# Patient Record
Sex: MALE | Race: BLACK OR AFRICAN AMERICAN
[De-identification: names, ages, dates, MRNs, and addresses within clinical notes are randomized per-mention and may not be internally consistent; named-entity substitution may affect disease eponyms.]

---

## 2021-03-26 ENCOUNTER — HOSPITAL ENCOUNTER (INPATIENT)
Dept: HOSPITAL 8 - ED | Age: 46
LOS: 1 days | Discharge: HOME | DRG: 393 | End: 2021-03-27
Attending: HOSPITALIST | Admitting: FAMILY MEDICINE
Payer: SELF-PAY

## 2021-03-26 VITALS — DIASTOLIC BLOOD PRESSURE: 84 MMHG | SYSTOLIC BLOOD PRESSURE: 121 MMHG

## 2021-03-26 VITALS — SYSTOLIC BLOOD PRESSURE: 137 MMHG | DIASTOLIC BLOOD PRESSURE: 72 MMHG

## 2021-03-26 VITALS — BODY MASS INDEX: 21.08 KG/M2 | WEIGHT: 150.58 LBS | HEIGHT: 71 IN

## 2021-03-26 DIAGNOSIS — D72.829: ICD-10-CM

## 2021-03-26 DIAGNOSIS — E87.2: ICD-10-CM

## 2021-03-26 DIAGNOSIS — R65.10: ICD-10-CM

## 2021-03-26 DIAGNOSIS — G89.29: ICD-10-CM

## 2021-03-26 DIAGNOSIS — D72.825: ICD-10-CM

## 2021-03-26 DIAGNOSIS — F12.10: ICD-10-CM

## 2021-03-26 DIAGNOSIS — K94.09: Primary | ICD-10-CM

## 2021-03-26 DIAGNOSIS — N17.0: ICD-10-CM

## 2021-03-26 DIAGNOSIS — Z20.822: ICD-10-CM

## 2021-03-26 LAB
<PLATELET ESTIMATE>: ADEQUATE
<PLT MORPHOLOGY>: (no result)
ALBUMIN SERPL-MCNC: 5 G/DL (ref 3.4–5)
ALP SERPL-CCNC: 87 U/L (ref 45–117)
ALT SERPL-CCNC: 27 U/L (ref 12–78)
ANION GAP SERPL CALC-SCNC: 10 MMOL/L (ref 5–15)
BAND#(MANUAL): 1.47 X10^3/UL
BILIRUB SERPL-MCNC: 0.7 MG/DL (ref 0.2–1)
CALCIUM SERPL-MCNC: 10.5 MG/DL (ref 8.5–10.1)
CHLORIDE SERPL-SCNC: 106 MMOL/L (ref 98–107)
CLOSTRIDIUM DIFFICILE ANTIGEN: NEGATIVE
CLOSTRIDIUM DIFFICILE TOXIN: NEGATIVE
CREAT SERPL-MCNC: 1.39 MG/DL (ref 0.7–1.3)
D DIMER PPP FEU-MCNC: 0.32 UG/MLFEU (ref 0–0.52)
ERYTHROCYTE [DISTWIDTH] IN BLOOD BY AUTOMATED COUNT: 13.4 % (ref 9.4–14.8)
INR PPP: 1.09 (ref 0.93–1.1)
LYMPH#(MANUAL): 1.62 X10^3/UL (ref 1–3.4)
LYMPHS% (MANUAL): 11 % (ref 22–44)
MCH RBC QN AUTO: 28.3 PG (ref 27.5–34.5)
MCHC RBC AUTO-ENTMCNC: 32.6 G/DL (ref 33.2–36.2)
MD: YES
MICROSCOPIC: (no result)
MONOS#(MANUAL): 0.44 X10^3/UL (ref 0.3–2.7)
MONOS% (MANUAL): 3 % (ref 2–9)
NEUTS BAND NFR BLD: 10 % (ref 0–7)
OVALOCYTES BLD QL SMEAR: (no result)
PLATELET # BLD AUTO: 215 X10^3/UL (ref 130–400)
PMV BLD AUTO: 9.7 FL (ref 7.4–10.4)
PROT SERPL-MCNC: 8.6 G/DL (ref 6.4–8.2)
PROTHROMBIN TIME: 11.6 SECONDS (ref 9.6–11.5)
RBC # BLD AUTO: 5.06 X10^6/UL (ref 4.38–5.82)
SEG#(MANUAL): 11.1 X10^3/UL (ref 1.8–6.8)
SEGS% (MANUAL): 76 % (ref 42–75)
TROPONIN I SERPL-MCNC: < 0.015 NG/ML (ref 0–0.04)

## 2021-03-26 PROCEDURE — 96361 HYDRATE IV INFUSION ADD-ON: CPT

## 2021-03-26 PROCEDURE — 84145 PROCALCITONIN (PCT): CPT

## 2021-03-26 PROCEDURE — 87324 CLOSTRIDIUM AG IA: CPT

## 2021-03-26 PROCEDURE — 85651 RBC SED RATE NONAUTOMATED: CPT

## 2021-03-26 PROCEDURE — 85610 PROTHROMBIN TIME: CPT

## 2021-03-26 PROCEDURE — 96375 TX/PRO/DX INJ NEW DRUG ADDON: CPT

## 2021-03-26 PROCEDURE — 80307 DRUG TEST PRSMV CHEM ANLYZR: CPT

## 2021-03-26 PROCEDURE — 85025 COMPLETE CBC W/AUTO DIFF WBC: CPT

## 2021-03-26 PROCEDURE — 85379 FIBRIN DEGRADATION QUANT: CPT

## 2021-03-26 PROCEDURE — 83605 ASSAY OF LACTIC ACID: CPT

## 2021-03-26 PROCEDURE — 93005 ELECTROCARDIOGRAM TRACING: CPT

## 2021-03-26 PROCEDURE — U0003 INFECTIOUS AGENT DETECTION BY NUCLEIC ACID (DNA OR RNA); SEVERE ACUTE RESPIRATORY SYNDROME CORONAVIRUS 2 (SARS-COV-2) (CORONAVIRUS DISEASE [COVID-19]), AMPLIFIED PROBE TECHNIQUE, MAKING USE OF HIGH THROUGHPUT TECHNOLOGIES AS DESCRIBED BY CMS-2020-01-R: HCPCS

## 2021-03-26 PROCEDURE — 74177 CT ABD & PELVIS W/CONTRAST: CPT

## 2021-03-26 PROCEDURE — 36415 COLL VENOUS BLD VENIPUNCTURE: CPT

## 2021-03-26 PROCEDURE — 83690 ASSAY OF LIPASE: CPT

## 2021-03-26 PROCEDURE — 87040 BLOOD CULTURE FOR BACTERIA: CPT

## 2021-03-26 PROCEDURE — 82728 ASSAY OF FERRITIN: CPT

## 2021-03-26 PROCEDURE — 84484 ASSAY OF TROPONIN QUANT: CPT

## 2021-03-26 PROCEDURE — 82962 GLUCOSE BLOOD TEST: CPT

## 2021-03-26 PROCEDURE — 86140 C-REACTIVE PROTEIN: CPT

## 2021-03-26 PROCEDURE — 80053 COMPREHEN METABOLIC PANEL: CPT

## 2021-03-26 PROCEDURE — 89055 LEUKOCYTE ASSESSMENT FECAL: CPT

## 2021-03-26 PROCEDURE — 81003 URINALYSIS AUTO W/O SCOPE: CPT

## 2021-03-26 PROCEDURE — 83615 LACTATE (LD) (LDH) ENZYME: CPT

## 2021-03-26 PROCEDURE — 71045 X-RAY EXAM CHEST 1 VIEW: CPT

## 2021-03-26 PROCEDURE — 96365 THER/PROPH/DIAG IV INF INIT: CPT

## 2021-03-26 RX ADMIN — SODIUM CHLORIDE, SODIUM LACTATE, POTASSIUM CHLORIDE, AND CALCIUM CHLORIDE SCH MLS/HR: .6; .31; .03; .02 INJECTION, SOLUTION INTRAVENOUS at 16:30

## 2021-03-26 NOTE — NUR
Pt resting comfortably in bed with eyes closed, rechecked temp, gave meds per 
MAR. Pt is requesting pain meds.

## 2021-03-26 NOTE — NUR
Pt provided additional warm blanket per request. Spoke with Dr. Mitchell about 
potential need for blood cultures for pt. Orders recieved.  now at 
bedside to collect ordered specimens. Pt talking on his phone, ABDIRAHMAN

## 2021-03-26 NOTE — NUR
Stool sample collected from pt's ostomy site. Specimen labeled at bedside, 
walked to lab. Multiple folded money noted in bed next to pt. This RN offered 
to have security keep the money safe and pt refuses this, taking the money and 
putting it in his pocket. Pt resting in bed with eyes closed, resp even and 
unlabored. Pt calm when staff not in room, when staff enters room pt begins 
moaning and shifting in bed. POC discussed with pt.

## 2021-03-26 NOTE — NUR
Pt able to void per urinal. 300mL concentrated yellow urine voided into urinal. 
Urine sample collected, labeled at bedside, sent to lab. Pt continues resting 
in bed, NADN.

## 2021-03-26 NOTE — NUR
Pt BIB friend who left after dropping him off. Pt is violently shaking with 
chills and explains that he is having chest and ab pain. Rectal temp 97.5. 
Colostomy present with red bloody contents. Pt is a poor historian despite 
education on importance of accurate information. Mouthing words and breathing 
rapidly, would speak in full sentences to the DR but has yet to give 
information to this RN. Placed on all monitors, VSS. IV placed, BG done, XRAY 
in room.

## 2021-03-27 VITALS — SYSTOLIC BLOOD PRESSURE: 115 MMHG | DIASTOLIC BLOOD PRESSURE: 66 MMHG

## 2021-03-27 VITALS — SYSTOLIC BLOOD PRESSURE: 105 MMHG | DIASTOLIC BLOOD PRESSURE: 64 MMHG

## 2021-03-27 LAB
ALBUMIN SERPL-MCNC: 3.4 G/DL (ref 3.4–5)
ALP SERPL-CCNC: 68 U/L (ref 45–117)
ALT SERPL-CCNC: 23 U/L (ref 12–78)
ANION GAP SERPL CALC-SCNC: 5 MMOL/L (ref 5–15)
BASOPHILS # BLD AUTO: 0 X10^3/UL (ref 0–0.1)
BASOPHILS NFR BLD AUTO: 0 % (ref 0–1)
BILIRUB SERPL-MCNC: 0.6 MG/DL (ref 0.2–1)
CALCIUM SERPL-MCNC: 8.2 MG/DL (ref 8.5–10.1)
CHLORIDE SERPL-SCNC: 107 MMOL/L (ref 98–107)
CREAT SERPL-MCNC: 0.92 MG/DL (ref 0.7–1.3)
CRP SERPL-MCNC: 1.1 MG/DL (ref 0.02–0.49)
EOSINOPHIL # BLD AUTO: 0.1 X10^3/UL (ref 0–0.4)
EOSINOPHIL NFR BLD AUTO: 1 % (ref 1–7)
ERYTHROCYTE [DISTWIDTH] IN BLOOD BY AUTOMATED COUNT: 13.4 % (ref 9.4–14.8)
ERYTHROCYTE [SEDIMENTATION RATE] IN BLOOD BY WESTERGREN METHOD: 7 MM/HR (ref 0–10)
HCT (SEDRATE): 38.2 % (ref 39.2–51.8)
LYMPHOCYTES # BLD AUTO: 1.7 X10^3/UL (ref 1–3.4)
LYMPHOCYTES NFR BLD AUTO: 16 % (ref 22–44)
MCH RBC QN AUTO: 28.5 PG (ref 27.5–34.5)
MCHC RBC AUTO-ENTMCNC: 32.5 G/DL (ref 33.2–36.2)
MD: NO
MONOCYTES # BLD AUTO: 0.7 X10^3/UL (ref 0.2–0.8)
MONOCYTES NFR BLD AUTO: 6 % (ref 2–9)
NEUTROPHILS # BLD AUTO: 8.4 X10^3/UL (ref 1.8–6.8)
NEUTROPHILS NFR BLD AUTO: 77 % (ref 42–75)
PLATELET # BLD AUTO: 182 X10^3/UL (ref 130–400)
PMV BLD AUTO: 9.4 FL (ref 7.4–10.4)
PROT SERPL-MCNC: 6.5 G/DL (ref 6.4–8.2)
RBC # BLD AUTO: 4.38 X10^6/UL (ref 4.38–5.82)

## 2021-03-27 RX ADMIN — SODIUM CHLORIDE, SODIUM LACTATE, POTASSIUM CHLORIDE, AND CALCIUM CHLORIDE SCH MLS/HR: .6; .31; .03; .02 INJECTION, SOLUTION INTRAVENOUS at 08:00

## 2021-03-27 RX ADMIN — SODIUM CHLORIDE, SODIUM LACTATE, POTASSIUM CHLORIDE, AND CALCIUM CHLORIDE SCH MLS/HR: .6; .31; .03; .02 INJECTION, SOLUTION INTRAVENOUS at 00:43
